# Patient Record
Sex: FEMALE | Race: WHITE | NOT HISPANIC OR LATINO | Employment: FULL TIME | URBAN - NONMETROPOLITAN AREA
[De-identification: names, ages, dates, MRNs, and addresses within clinical notes are randomized per-mention and may not be internally consistent; named-entity substitution may affect disease eponyms.]

---

## 2017-05-09 ENCOUNTER — HOSPITAL ENCOUNTER (EMERGENCY)
Facility: HOSPITAL | Age: 42
Discharge: HOME | End: 2017-05-10
Attending: EMERGENCY MEDICINE | Admitting: EMERGENCY MEDICINE

## 2017-05-09 DIAGNOSIS — R55 NEAR SYNCOPE: ICD-10-CM

## 2017-05-09 DIAGNOSIS — N30.90 CYSTITIS: Primary | ICD-10-CM

## 2017-05-09 PROCEDURE — 96360 HYDRATION IV INFUSION INIT: CPT

## 2017-05-09 PROCEDURE — 99283 EMERGENCY DEPT VISIT LOW MDM: CPT

## 2017-05-09 RX ORDER — MELOXICAM 15 MG/1
15 TABLET ORAL DAILY
COMMUNITY

## 2017-05-09 RX ORDER — ERGOCALCIFEROL 1.25 MG/1
50000 CAPSULE ORAL WEEKLY
COMMUNITY
End: 2018-05-04

## 2017-05-09 RX ORDER — HYDROXYZINE PAMOATE 100 MG/1
100 CAPSULE ORAL 4 TIMES DAILY PRN
COMMUNITY
End: 2018-05-04 | Stop reason: ALTCHOICE

## 2017-05-09 RX ORDER — SODIUM CHLORIDE 0.9 % (FLUSH) 0.9 %
10 SYRINGE (ML) INJECTION AS NEEDED
Status: DISCONTINUED | OUTPATIENT
Start: 2017-05-09 | End: 2017-05-10 | Stop reason: HOSPADM

## 2017-05-09 RX ORDER — DULOXETIN HYDROCHLORIDE 30 MG/1
30 CAPSULE, DELAYED RELEASE ORAL DAILY
COMMUNITY
End: 2018-05-04 | Stop reason: ALTCHOICE

## 2017-05-09 RX ORDER — GABAPENTIN 100 MG/1
100 CAPSULE ORAL 3 TIMES DAILY
COMMUNITY

## 2017-05-09 RX ORDER — TRAZODONE HYDROCHLORIDE 100 MG/1
100 TABLET ORAL NIGHTLY
COMMUNITY
End: 2018-05-04

## 2017-05-09 RX ORDER — TIZANIDINE 4 MG/1
4 TABLET ORAL 3 TIMES DAILY
COMMUNITY

## 2017-05-10 VITALS
RESPIRATION RATE: 18 BRPM | HEIGHT: 69 IN | WEIGHT: 184.38 LBS | DIASTOLIC BLOOD PRESSURE: 85 MMHG | BODY MASS INDEX: 27.31 KG/M2 | OXYGEN SATURATION: 99 % | HEART RATE: 86 BPM | TEMPERATURE: 98.4 F | SYSTOLIC BLOOD PRESSURE: 138 MMHG

## 2017-05-10 LAB
ALBUMIN SERPL-MCNC: 3.8 G/DL (ref 3.5–5)
ALBUMIN/GLOB SERPL: 1.2 G/DL (ref 1.1–2.5)
ALP SERPL-CCNC: 71 U/L (ref 24–120)
ALT SERPL W P-5'-P-CCNC: 25 U/L (ref 0–54)
AMYLASE SERPL-CCNC: 53 U/L (ref 30–110)
ANION GAP SERPL CALCULATED.3IONS-SCNC: 12 MMOL/L (ref 4–13)
AST SERPL-CCNC: 24 U/L (ref 7–45)
BACTERIA UR QL AUTO: ABNORMAL /HPF
BASOPHILS # BLD AUTO: 0.02 10*3/MM3 (ref 0–0.2)
BASOPHILS NFR BLD AUTO: 0.2 % (ref 0–2)
BILIRUB SERPL-MCNC: 0.2 MG/DL (ref 0.1–1)
BILIRUB UR QL STRIP: NEGATIVE
BUN BLD-MCNC: 8 MG/DL (ref 5–21)
BUN/CREAT SERPL: 9.2 (ref 7–25)
CALCIUM SPEC-SCNC: 9.1 MG/DL (ref 8.4–10.4)
CHLORIDE SERPL-SCNC: 102 MMOL/L (ref 98–110)
CLARITY UR: CLEAR
CO2 SERPL-SCNC: 24 MMOL/L (ref 24–31)
COLOR UR: YELLOW
CREAT BLD-MCNC: 0.87 MG/DL (ref 0.5–1.4)
DEPRECATED RDW RBC AUTO: 46.2 FL (ref 40–54)
EOSINOPHIL # BLD AUTO: 0.24 10*3/MM3 (ref 0–0.7)
EOSINOPHIL NFR BLD AUTO: 3 % (ref 0–4)
ERYTHROCYTE [DISTWIDTH] IN BLOOD BY AUTOMATED COUNT: 16.8 % (ref 12–15)
GFR SERPL CREATININE-BSD FRML MDRD: 72 ML/MIN/1.73
GLOBULIN UR ELPH-MCNC: 3.2 GM/DL
GLUCOSE BLD-MCNC: 102 MG/DL (ref 70–100)
GLUCOSE UR STRIP-MCNC: NEGATIVE MG/DL
HCG SERPL QL: NEGATIVE
HCT VFR BLD AUTO: 34.9 % (ref 37–47)
HGB BLD-MCNC: 11.3 G/DL (ref 12–16)
HGB UR QL STRIP.AUTO: NEGATIVE
HYALINE CASTS UR QL AUTO: ABNORMAL /LPF
IMM GRANULOCYTES # BLD: 0.03 10*3/MM3 (ref 0–0.03)
IMM GRANULOCYTES NFR BLD: 0.4 % (ref 0–5)
KETONES UR QL STRIP: ABNORMAL
LEUKOCYTE ESTERASE UR QL STRIP.AUTO: ABNORMAL
LIPASE SERPL-CCNC: 72 U/L (ref 23–203)
LYMPHOCYTES # BLD AUTO: 3.21 10*3/MM3 (ref 0.72–4.86)
LYMPHOCYTES NFR BLD AUTO: 39.5 % (ref 15–45)
MCH RBC QN AUTO: 24.6 PG (ref 28–32)
MCHC RBC AUTO-ENTMCNC: 32.4 G/DL (ref 33–36)
MCV RBC AUTO: 75.9 FL (ref 82–98)
MONOCYTES # BLD AUTO: 0.73 10*3/MM3 (ref 0.19–1.3)
MONOCYTES NFR BLD AUTO: 9 % (ref 4–12)
NEUTROPHILS # BLD AUTO: 3.89 10*3/MM3 (ref 1.87–8.4)
NEUTROPHILS NFR BLD AUTO: 47.9 % (ref 39–78)
NITRITE UR QL STRIP: NEGATIVE
PH UR STRIP.AUTO: 6 [PH] (ref 5–8)
PLATELET # BLD AUTO: 325 10*3/MM3 (ref 130–400)
PMV BLD AUTO: 10.7 FL (ref 6–12)
POTASSIUM BLD-SCNC: 3.7 MMOL/L (ref 3.5–5.3)
PROT SERPL-MCNC: 7 G/DL (ref 6.3–8.7)
PROT UR QL STRIP: NEGATIVE
RBC # BLD AUTO: 4.6 10*6/MM3 (ref 4.2–5.4)
RBC # UR: ABNORMAL /HPF
REF LAB TEST METHOD: ABNORMAL
SODIUM BLD-SCNC: 138 MMOL/L (ref 135–145)
SP GR UR STRIP: 1.03 (ref 1–1.03)
SQUAMOUS #/AREA URNS HPF: ABNORMAL /HPF
UROBILINOGEN UR QL STRIP: ABNORMAL
WBC NRBC COR # BLD: 8.12 10*3/MM3 (ref 4.8–10.8)
WBC UR QL AUTO: ABNORMAL /HPF

## 2017-05-10 PROCEDURE — 87086 URINE CULTURE/COLONY COUNT: CPT | Performed by: EMERGENCY MEDICINE

## 2017-05-10 PROCEDURE — 82150 ASSAY OF AMYLASE: CPT | Performed by: EMERGENCY MEDICINE

## 2017-05-10 PROCEDURE — 85025 COMPLETE CBC W/AUTO DIFF WBC: CPT | Performed by: EMERGENCY MEDICINE

## 2017-05-10 PROCEDURE — 81001 URINALYSIS AUTO W/SCOPE: CPT | Performed by: EMERGENCY MEDICINE

## 2017-05-10 PROCEDURE — 80053 COMPREHEN METABOLIC PANEL: CPT | Performed by: EMERGENCY MEDICINE

## 2017-05-10 PROCEDURE — 84703 CHORIONIC GONADOTROPIN ASSAY: CPT | Performed by: EMERGENCY MEDICINE

## 2017-05-10 PROCEDURE — 83690 ASSAY OF LIPASE: CPT | Performed by: EMERGENCY MEDICINE

## 2017-05-10 PROCEDURE — 96360 HYDRATION IV INFUSION INIT: CPT

## 2017-05-10 RX ORDER — SULFAMETHOXAZOLE AND TRIMETHOPRIM 800; 160 MG/1; MG/1
1 TABLET ORAL 2 TIMES DAILY
Qty: 20 TABLET | Refills: 0 | OUTPATIENT
Start: 2017-05-10 | End: 2018-02-08

## 2017-05-10 RX ORDER — ACETAMINOPHEN 500 MG
1000 TABLET ORAL ONCE
Status: COMPLETED | OUTPATIENT
Start: 2017-05-10 | End: 2017-05-10

## 2017-05-10 RX ADMIN — ACETAMINOPHEN 1000 MG: 500 TABLET ORAL at 00:26

## 2017-05-10 RX ADMIN — SODIUM CHLORIDE 500 ML: 9 INJECTION, SOLUTION INTRAVENOUS at 00:11

## 2017-05-12 LAB — BACTERIA SPEC AEROBE CULT: ABNORMAL

## 2017-09-01 ENCOUNTER — APPOINTMENT (OUTPATIENT)
Dept: GENERAL RADIOLOGY | Facility: HOSPITAL | Age: 42
End: 2017-09-01

## 2017-09-01 ENCOUNTER — HOSPITAL ENCOUNTER (EMERGENCY)
Facility: HOSPITAL | Age: 42
Discharge: HOME | End: 2017-09-01
Admitting: EMERGENCY MEDICINE

## 2017-09-01 VITALS
RESPIRATION RATE: 18 BRPM | DIASTOLIC BLOOD PRESSURE: 80 MMHG | TEMPERATURE: 97.9 F | HEIGHT: 69 IN | WEIGHT: 186 LBS | HEART RATE: 89 BPM | SYSTOLIC BLOOD PRESSURE: 137 MMHG | BODY MASS INDEX: 27.55 KG/M2 | OXYGEN SATURATION: 98 %

## 2017-09-01 DIAGNOSIS — J06.9 UPPER RESPIRATORY TRACT INFECTION, UNSPECIFIED TYPE: Primary | ICD-10-CM

## 2017-09-01 LAB
B-HCG UR QL: NEGATIVE
INTERNAL NEGATIVE CONTROL: NEGATIVE
INTERNAL POSITIVE CONTROL: POSITIVE
Lab: NORMAL

## 2017-09-01 PROCEDURE — 94799 UNLISTED PULMONARY SVC/PX: CPT

## 2017-09-01 PROCEDURE — 71020 HC CHEST PA AND LATERAL: CPT

## 2017-09-01 PROCEDURE — 96372 THER/PROPH/DIAG INJ SC/IM: CPT

## 2017-09-01 PROCEDURE — 25010000002 DEXAMETHASONE PER 1 MG: Performed by: NURSE PRACTITIONER

## 2017-09-01 PROCEDURE — 99283 EMERGENCY DEPT VISIT LOW MDM: CPT

## 2017-09-01 PROCEDURE — 94640 AIRWAY INHALATION TREATMENT: CPT

## 2017-09-01 RX ORDER — IPRATROPIUM BROMIDE AND ALBUTEROL SULFATE 2.5; .5 MG/3ML; MG/3ML
3 SOLUTION RESPIRATORY (INHALATION) ONCE
Status: COMPLETED | OUTPATIENT
Start: 2017-09-01 | End: 2017-09-01

## 2017-09-01 RX ORDER — DEXAMETHASONE SODIUM PHOSPHATE 10 MG/ML
10 INJECTION INTRAMUSCULAR; INTRAVENOUS ONCE
Status: COMPLETED | OUTPATIENT
Start: 2017-09-01 | End: 2017-09-01

## 2017-09-01 RX ORDER — AMOXICILLIN AND CLAVULANATE POTASSIUM 875; 125 MG/1; MG/1
1 TABLET, FILM COATED ORAL 2 TIMES DAILY
Qty: 14 TABLET | Refills: 0 | Status: SHIPPED | OUTPATIENT
Start: 2017-09-01 | End: 2017-09-08

## 2017-09-01 RX ORDER — ALBUTEROL SULFATE 90 UG/1
2 AEROSOL, METERED RESPIRATORY (INHALATION) EVERY 4 HOURS PRN
Qty: 1 INHALER | Refills: 0 | Status: SHIPPED | OUTPATIENT
Start: 2017-09-01 | End: 2017-09-08

## 2017-09-01 RX ORDER — METHYLPREDNISOLONE 4 MG/1
TABLET ORAL
Qty: 1 EACH | Refills: 0 | OUTPATIENT
Start: 2017-09-01 | End: 2018-02-08

## 2017-09-01 RX ADMIN — DEXAMETHASONE SODIUM PHOSPHATE 10 MG: 10 INJECTION, SOLUTION INTRAMUSCULAR; INTRAVENOUS at 13:11

## 2017-09-01 RX ADMIN — IPRATROPIUM BROMIDE AND ALBUTEROL SULFATE 3 ML: 2.5; .5 SOLUTION RESPIRATORY (INHALATION) at 13:19

## 2017-09-01 NOTE — ED PROVIDER NOTES
Subjective   Patient is a 41 y.o. female presenting with URI.   History provided by:  Patient   used: No    URI   Presenting symptoms: congestion, cough and ear pain    Severity:  Mild  Onset quality:  Sudden  Duration:  2 days  Timing:  Constant  Progression:  Unchanged  Chronicity:  New  Relieved by:  Nothing  Worsened by:  Nothing  Ineffective treatments:  None tried  Associated symptoms: myalgias    Risk factors: not elderly, no chronic cardiac disease, no chronic kidney disease, no chronic respiratory disease, no diabetes mellitus, no immunosuppression, no recent illness, no recent travel and no sick contacts        Review of Systems   HENT: Positive for congestion and ear pain.    Respiratory: Positive for cough.    Musculoskeletal: Positive for myalgias.   All other systems reviewed and are negative.      Past Medical History:   Diagnosis Date   • ADHD (attention deficit hyperactivity disorder)    • Back pain    • Neck pain        No Known Allergies    Past Surgical History:   Procedure Laterality Date   •  SECTION     • DENTAL PROCEDURE     • HAND SURGERY     • TUBAL ABDOMINAL LIGATION         History reviewed. No pertinent family history.    Social History     Social History   • Marital status:      Spouse name: N/A   • Number of children: N/A   • Years of education: N/A     Social History Main Topics   • Smoking status: Current Every Day Smoker     Packs/day: 1.00     Types: Cigarettes   • Smokeless tobacco: None   • Alcohol use No   • Drug use: No   • Sexual activity: Not Asked     Other Topics Concern   • None     Social History Narrative           Objective   Physical Exam   Constitutional: She is oriented to person, place, and time. She appears well-developed and well-nourished.   HENT:   Head: Normocephalic and atraumatic.   Eyes: Conjunctivae are normal. Pupils are equal, round, and reactive to light.   Neck: Normal range of motion.   Cardiovascular: Normal rate,  regular rhythm and normal heart sounds.    Pulmonary/Chest: Effort normal and breath sounds normal.   Abdominal: Soft. Bowel sounds are normal.   Musculoskeletal: Normal range of motion.   Neurological: She is alert and oriented to person, place, and time.   Skin: Skin is warm and dry.   Psychiatric: She has a normal mood and affect.   Nursing note and vitals reviewed.      Procedures         ED Course  ED Course   Comment By Time   Patient just x-ray shows no acute findings.  At this time patient will be discharged home in stable condition.  I will give her prescription for Medrol Dosepak.  Advised her to start this tomorrow as well as an inhaler.She states follow-up with her primary care provider Monday for recheck.  Return to the ER if any new or worsening symptoms. Nichole Mark, APRN 09/01 1352      XR Chest 2 View   Final Result   Impression: No evidence of acute cardiopulmonary disease.                                                    This report was finalized on 09/01/2017 13:26 by Dr. Camilo Goodwin MD.        Lab Results (last 24 hours)     Procedure Component Value Units Date/Time    POCT Pregnancy, urine [[id number]]  (Normal) Collected:  09/01/17 1310    Specimen:  Urine Updated:  09/01/17 1310     HCG, Urine, QL Negative     Lot Number \[id number]\     Internal Positive Control Positive     Internal Negative Control Negative                  MDM  Number of Diagnoses or Management Options  Upper respiratory tract infection, unspecified type: new and requires workup     Amount and/or Complexity of Data Reviewed  Clinical lab tests: ordered and reviewed  Tests in the radiology section of CPT®: ordered and reviewed    Patient Progress  Patient progress: stable      Final diagnoses:   Upper respiratory tract infection, unspecified type            Nichole Mark, CAMELIA  09/01/17 1285       Nichole Mark, APRELOISE  09/11/17 6083

## 2017-09-01 NOTE — ED NOTES
C/o coughing non productive with right ear ache no drainage, and weakness onset yesterday. No fever c/o nausea.     Merlene Moran RN  09/01/17 1158

## 2017-10-15 ENCOUNTER — APPOINTMENT (OUTPATIENT)
Dept: GENERAL RADIOLOGY | Facility: HOSPITAL | Age: 42
End: 2017-10-15

## 2017-10-15 ENCOUNTER — HOSPITAL ENCOUNTER (EMERGENCY)
Facility: HOSPITAL | Age: 42
Discharge: HOME | End: 2017-10-15
Attending: EMERGENCY MEDICINE | Admitting: EMERGENCY MEDICINE

## 2017-10-15 VITALS
HEART RATE: 90 BPM | TEMPERATURE: 98.2 F | HEIGHT: 69 IN | BODY MASS INDEX: 26.66 KG/M2 | SYSTOLIC BLOOD PRESSURE: 127 MMHG | DIASTOLIC BLOOD PRESSURE: 70 MMHG | RESPIRATION RATE: 17 BRPM | OXYGEN SATURATION: 99 % | WEIGHT: 180 LBS

## 2017-10-15 DIAGNOSIS — R07.89 ACUTE CHEST WALL PAIN: Primary | ICD-10-CM

## 2017-10-15 DIAGNOSIS — M79.10 MUSCLE PAIN: ICD-10-CM

## 2017-10-15 LAB
ALBUMIN SERPL-MCNC: 4.4 G/DL (ref 3.5–5)
ALBUMIN/GLOB SERPL: 1.3 G/DL (ref 1.1–2.5)
ALP SERPL-CCNC: 70 U/L (ref 24–120)
ALT SERPL W P-5'-P-CCNC: 27 U/L (ref 0–54)
ANION GAP SERPL CALCULATED.3IONS-SCNC: 13 MMOL/L (ref 4–13)
AST SERPL-CCNC: 21 U/L (ref 7–45)
BASOPHILS # BLD AUTO: 0.04 10*3/MM3 (ref 0–0.2)
BASOPHILS NFR BLD AUTO: 0.4 % (ref 0–2)
BILIRUB SERPL-MCNC: 0.2 MG/DL (ref 0.1–1)
BUN BLD-MCNC: 9 MG/DL (ref 5–21)
BUN/CREAT SERPL: 12.7 (ref 7–25)
CALCIUM SPEC-SCNC: 9.3 MG/DL (ref 8.4–10.4)
CHLORIDE SERPL-SCNC: 105 MMOL/L (ref 98–110)
CO2 SERPL-SCNC: 24 MMOL/L (ref 24–31)
CREAT BLD-MCNC: 0.71 MG/DL (ref 0.5–1.4)
D DIMER PPP FEU-MCNC: 0.26 MG/L (FEU) (ref 0–0.5)
DEPRECATED RDW RBC AUTO: 45.5 FL (ref 40–54)
EOSINOPHIL # BLD AUTO: 0.15 10*3/MM3 (ref 0–0.7)
EOSINOPHIL NFR BLD AUTO: 1.6 % (ref 0–4)
ERYTHROCYTE [DISTWIDTH] IN BLOOD BY AUTOMATED COUNT: 15.7 % (ref 12–15)
FLUAV AG NPH QL: NEGATIVE
FLUBV AG NPH QL IA: NEGATIVE
GFR SERPL CREATININE-BSD FRML MDRD: 91 ML/MIN/1.73
GLOBULIN UR ELPH-MCNC: 3.4 GM/DL
GLUCOSE BLD-MCNC: 91 MG/DL (ref 70–100)
HCT VFR BLD AUTO: 42.5 % (ref 37–47)
HGB BLD-MCNC: 13.8 G/DL (ref 12–16)
IMM GRANULOCYTES # BLD: 0.04 10*3/MM3 (ref 0–0.03)
IMM GRANULOCYTES NFR BLD: 0.4 % (ref 0–5)
LYMPHOCYTES # BLD AUTO: 2.52 10*3/MM3 (ref 0.72–4.86)
LYMPHOCYTES NFR BLD AUTO: 26.9 % (ref 15–45)
MCH RBC QN AUTO: 25.8 PG (ref 28–32)
MCHC RBC AUTO-ENTMCNC: 32.5 G/DL (ref 33–36)
MCV RBC AUTO: 79.6 FL (ref 82–98)
MONOCYTES # BLD AUTO: 0.55 10*3/MM3 (ref 0.19–1.3)
MONOCYTES NFR BLD AUTO: 5.9 % (ref 4–12)
NEUTROPHILS # BLD AUTO: 6.07 10*3/MM3 (ref 1.87–8.4)
NEUTROPHILS NFR BLD AUTO: 64.8 % (ref 39–78)
NT-PROBNP SERPL-MCNC: 22.5 PG/ML (ref 0–450)
PLATELET # BLD AUTO: 380 10*3/MM3 (ref 130–400)
PMV BLD AUTO: 10.9 FL (ref 6–12)
POTASSIUM BLD-SCNC: 4.1 MMOL/L (ref 3.5–5.3)
PROT SERPL-MCNC: 7.8 G/DL (ref 6.3–8.7)
RBC # BLD AUTO: 5.34 10*6/MM3 (ref 4.2–5.4)
SODIUM BLD-SCNC: 142 MMOL/L (ref 135–145)
TROPONIN I SERPL-MCNC: <0.012 NG/ML (ref 0–0.03)
TROPONIN I SERPL-MCNC: <0.012 NG/ML (ref 0–0.03)
WBC NRBC COR # BLD: 9.37 10*3/MM3 (ref 4.8–10.8)

## 2017-10-15 PROCEDURE — 36415 COLL VENOUS BLD VENIPUNCTURE: CPT

## 2017-10-15 PROCEDURE — 84484 ASSAY OF TROPONIN QUANT: CPT | Performed by: EMERGENCY MEDICINE

## 2017-10-15 PROCEDURE — 85025 COMPLETE CBC W/AUTO DIFF WBC: CPT | Performed by: EMERGENCY MEDICINE

## 2017-10-15 PROCEDURE — 71020 HC CHEST PA AND LATERAL: CPT

## 2017-10-15 PROCEDURE — 80053 COMPREHEN METABOLIC PANEL: CPT | Performed by: EMERGENCY MEDICINE

## 2017-10-15 PROCEDURE — 85379 FIBRIN DEGRADATION QUANT: CPT | Performed by: EMERGENCY MEDICINE

## 2017-10-15 PROCEDURE — 83880 ASSAY OF NATRIURETIC PEPTIDE: CPT | Performed by: EMERGENCY MEDICINE

## 2017-10-15 PROCEDURE — 93005 ELECTROCARDIOGRAM TRACING: CPT | Performed by: EMERGENCY MEDICINE

## 2017-10-15 PROCEDURE — 96375 TX/PRO/DX INJ NEW DRUG ADDON: CPT

## 2017-10-15 PROCEDURE — 25010000002 ONDANSETRON PER 1 MG: Performed by: EMERGENCY MEDICINE

## 2017-10-15 PROCEDURE — 93005 ELECTROCARDIOGRAM TRACING: CPT

## 2017-10-15 PROCEDURE — 87804 INFLUENZA ASSAY W/OPTIC: CPT | Performed by: EMERGENCY MEDICINE

## 2017-10-15 PROCEDURE — 93010 ELECTROCARDIOGRAM REPORT: CPT | Performed by: INTERNAL MEDICINE

## 2017-10-15 PROCEDURE — 99284 EMERGENCY DEPT VISIT MOD MDM: CPT

## 2017-10-15 PROCEDURE — 96374 THER/PROPH/DIAG INJ IV PUSH: CPT

## 2017-10-15 PROCEDURE — 25010000002 HYDROMORPHONE PER 4 MG: Performed by: EMERGENCY MEDICINE

## 2017-10-15 RX ORDER — ONDANSETRON 2 MG/ML
4 INJECTION INTRAMUSCULAR; INTRAVENOUS ONCE
Status: COMPLETED | OUTPATIENT
Start: 2017-10-15 | End: 2017-10-15

## 2017-10-15 RX ORDER — HYDROCODONE BITARTRATE AND ACETAMINOPHEN 7.5; 325 MG/1; MG/1
1 TABLET ORAL ONCE
Status: COMPLETED | OUTPATIENT
Start: 2017-10-15 | End: 2017-10-15

## 2017-10-15 RX ADMIN — HYDROMORPHONE HYDROCHLORIDE 1 MG: 1 INJECTION, SOLUTION INTRAMUSCULAR; INTRAVENOUS; SUBCUTANEOUS at 12:54

## 2017-10-15 RX ADMIN — ONDANSETRON 4 MG: 2 INJECTION, SOLUTION INTRAMUSCULAR; INTRAVENOUS at 12:54

## 2017-10-15 RX ADMIN — HYDROCODONE BITARTRATE AND ACETAMINOPHEN 1 TABLET: 7.5; 325 TABLET ORAL at 17:00

## 2017-10-15 NOTE — ED NOTES
"PATIENT C/O NONPRODUCTIVE COUGH X 2 WEEKS. PT STATES SHE HAD \"A COUGHING FIT\" THIS MORNING AND SINCE THAT TIME HAS HAD LEFT LOWER RIB PAIN AND \"MUSCLE SPASMS\" IN LEFT MID BACK. PT STATES PAIN WORSE WITH MOVEMENT AND LYING FLAT.      Fawn Cummings RN  10/15/17 1108    "

## 2017-10-15 NOTE — ED PROVIDER NOTES
"Subjective   HPI Comments: Patient is a 41-year-old female who reports that she has had a dry cough for the last 2 weeks.  She reports this morning at approximately 9:30 AM that she was drinking coffee and suddenly coughed and developed \"sharp\" left-sided lower front rib pain and left-sided upper back pain.  She reports that when the pain is at its worse it gives her mild shortness of breath.  She reports her pain is worsened by movement and lying down and relieved by nothing.      History provided by:  Patient      Review of Systems   Constitutional: Negative.    HENT: Negative.    Eyes: Negative.    Respiratory: Positive for cough and shortness of breath.    Cardiovascular: Negative.    Gastrointestinal: Negative.    Endocrine: Negative.    Genitourinary: Negative.    Musculoskeletal: Positive for back pain (Left sided upper posterior pain).        Left lower anterior rib pain.   Skin: Negative.    Allergic/Immunologic: Negative.    Neurological: Negative.    Hematological: Negative.    Psychiatric/Behavioral: Negative.    All other systems reviewed and are negative.      Past Medical History:   Diagnosis Date   • ADHD (attention deficit hyperactivity disorder)    • Back pain    • Neck pain        No Known Allergies    Past Surgical History:   Procedure Laterality Date   •  SECTION     • DENTAL PROCEDURE     • HAND SURGERY     • TUBAL ABDOMINAL LIGATION         History reviewed. No pertinent family history.    Social History     Social History   • Marital status:      Spouse name: N/A   • Number of children: N/A   • Years of education: N/A     Social History Main Topics   • Smoking status: Current Every Day Smoker     Packs/day: 1.00     Types: Cigarettes   • Smokeless tobacco: None   • Alcohol use No   • Drug use: No   • Sexual activity: Not Asked     Other Topics Concern   • None     Social History Narrative           Objective   Physical Exam   Constitutional: She is oriented to person, place, " and time. She appears well-developed and well-nourished.   Moderately painful appearing female.   HENT:   Head: Normocephalic and atraumatic.   Right Ear: External ear normal.   Left Ear: External ear normal.   Nose: Nose normal.   Mouth/Throat: Oropharynx is clear and moist.   Eyes: Conjunctivae and EOM are normal. Pupils are equal, round, and reactive to light. Right eye exhibits no discharge. Left eye exhibits no discharge.   Neck: Normal range of motion. Neck supple. No tracheal deviation present. No thyromegaly present.   Cardiovascular: Normal rate, regular rhythm, normal heart sounds and intact distal pulses.    No murmur heard.  Pulmonary/Chest: Effort normal and breath sounds normal. No respiratory distress. She exhibits tenderness (Mild pain to palpation of the left anterior lower chest wall area.).   Abdominal: Soft. She exhibits no distension. There is no tenderness.   Musculoskeletal: Normal range of motion. She exhibits tenderness (Severe pain to palpation of the left sided posterior rib area of the upper thoracic area (from the T-2 to the T-4 area.). She exhibits no edema or deformity.   Neurological: She is alert and oriented to person, place, and time. No cranial nerve deficit.   Skin: Skin is warm and dry. No erythema. No pallor.   Psychiatric: Judgment normal.   Painful appearing female   Nursing note and vitals reviewed.      Procedures         ED Course  ED Course   Comment By Time   The patient reports that she is feeling better at this time. Caleb Borjas MD 10/15 1444                  MDM  Number of Diagnoses or Management Options  Acute chest wall pain: new and requires workup  Muscle pain: new and requires workup     Amount and/or Complexity of Data Reviewed  Clinical lab tests: ordered and reviewed  Tests in the radiology section of CPT®: ordered and reviewed    Risk of Complications, Morbidity, and/or Mortality  Presenting problems: moderate  Diagnostic procedures:  moderate  Management options: low    Patient Progress  Patient progress: stable      Final diagnoses:   Acute chest wall pain   Muscle pain            Caleb Borjas MD  10/17/17 4095

## 2018-01-09 ENCOUNTER — APPOINTMENT (OUTPATIENT)
Dept: GENERAL RADIOLOGY | Facility: HOSPITAL | Age: 43
End: 2018-01-09

## 2018-01-09 ENCOUNTER — HOSPITAL ENCOUNTER (EMERGENCY)
Facility: HOSPITAL | Age: 43
Discharge: HOME | End: 2018-01-09
Admitting: EMERGENCY MEDICINE

## 2018-01-09 VITALS
SYSTOLIC BLOOD PRESSURE: 133 MMHG | TEMPERATURE: 98.1 F | DIASTOLIC BLOOD PRESSURE: 83 MMHG | WEIGHT: 170 LBS | HEIGHT: 69 IN | BODY MASS INDEX: 25.18 KG/M2 | HEART RATE: 84 BPM | RESPIRATION RATE: 18 BRPM | OXYGEN SATURATION: 100 %

## 2018-01-09 DIAGNOSIS — N39.0 UTI (URINARY TRACT INFECTION), UNCOMPLICATED: Primary | ICD-10-CM

## 2018-01-09 DIAGNOSIS — N93.9 VAGINAL BLEEDING: ICD-10-CM

## 2018-01-09 LAB
ALBUMIN SERPL-MCNC: 4.1 G/DL (ref 3.5–5)
ALBUMIN/GLOB SERPL: 1.2 G/DL (ref 1.1–2.5)
ALP SERPL-CCNC: 88 U/L (ref 24–120)
ALT SERPL W P-5'-P-CCNC: 29 U/L (ref 0–54)
ANION GAP SERPL CALCULATED.3IONS-SCNC: 12 MMOL/L (ref 4–13)
AST SERPL-CCNC: 24 U/L (ref 7–45)
B-HCG UR QL: NEGATIVE
BACTERIA UR QL AUTO: ABNORMAL /HPF
BASOPHILS # BLD AUTO: 0.02 10*3/MM3 (ref 0–0.2)
BASOPHILS NFR BLD AUTO: 0.3 % (ref 0–2)
BILIRUB SERPL-MCNC: 0.2 MG/DL (ref 0.1–1)
BILIRUB UR QL STRIP: ABNORMAL
BUN BLD-MCNC: 7 MG/DL (ref 5–21)
BUN/CREAT SERPL: 9.2 (ref 7–25)
CALCIUM SPEC-SCNC: 8.9 MG/DL (ref 8.4–10.4)
CHLORIDE SERPL-SCNC: 103 MMOL/L (ref 98–110)
CLARITY UR: CLEAR
CO2 SERPL-SCNC: 28 MMOL/L (ref 24–31)
COLOR UR: ABNORMAL
CREAT BLD-MCNC: 0.76 MG/DL (ref 0.5–1.4)
DEPRECATED RDW RBC AUTO: 45.7 FL (ref 40–54)
EOSINOPHIL # BLD AUTO: 0.23 10*3/MM3 (ref 0–0.7)
EOSINOPHIL NFR BLD AUTO: 3 % (ref 0–4)
ERYTHROCYTE [DISTWIDTH] IN BLOOD BY AUTOMATED COUNT: 16.1 % (ref 12–15)
FLUAV AG NPH QL: NEGATIVE
FLUBV AG NPH QL IA: NEGATIVE
GFR SERPL CREATININE-BSD FRML MDRD: 84 ML/MIN/1.73
GLOBULIN UR ELPH-MCNC: 3.3 GM/DL
GLUCOSE BLD-MCNC: 95 MG/DL (ref 70–100)
GLUCOSE UR STRIP-MCNC: NEGATIVE MG/DL
HCT VFR BLD AUTO: 38.1 % (ref 37–47)
HGB BLD-MCNC: 12.1 G/DL (ref 12–16)
HGB UR QL STRIP.AUTO: ABNORMAL
HYALINE CASTS UR QL AUTO: ABNORMAL /LPF
IMM GRANULOCYTES # BLD: 0.04 10*3/MM3 (ref 0–0.03)
IMM GRANULOCYTES NFR BLD: 0.5 % (ref 0–5)
KETONES UR QL STRIP: ABNORMAL
LEUKOCYTE ESTERASE UR QL STRIP.AUTO: ABNORMAL
LYMPHOCYTES # BLD AUTO: 2.02 10*3/MM3 (ref 0.72–4.86)
LYMPHOCYTES NFR BLD AUTO: 26.6 % (ref 15–45)
MCH RBC QN AUTO: 25.2 PG (ref 28–32)
MCHC RBC AUTO-ENTMCNC: 31.8 G/DL (ref 33–36)
MCV RBC AUTO: 79.2 FL (ref 82–98)
MONOCYTES # BLD AUTO: 0.65 10*3/MM3 (ref 0.19–1.3)
MONOCYTES NFR BLD AUTO: 8.6 % (ref 4–12)
MUCOUS THREADS URNS QL MICRO: ABNORMAL /HPF
NEUTROPHILS # BLD AUTO: 4.64 10*3/MM3 (ref 1.87–8.4)
NEUTROPHILS NFR BLD AUTO: 61 % (ref 39–78)
NITRITE UR QL STRIP: NEGATIVE
NRBC BLD MANUAL-RTO: 0 /100 WBC (ref 0–0)
PH UR STRIP.AUTO: 5.5 [PH] (ref 5–8)
PLAT MORPH BLD: NORMAL
PLATELET # BLD AUTO: 361 10*3/MM3 (ref 130–400)
PMV BLD AUTO: 10.5 FL (ref 6–12)
POTASSIUM BLD-SCNC: 3.3 MMOL/L (ref 3.5–5.3)
PROT SERPL-MCNC: 7.4 G/DL (ref 6.3–8.7)
PROT UR QL STRIP: ABNORMAL
RBC # BLD AUTO: 4.81 10*6/MM3 (ref 4.2–5.4)
RBC # UR: ABNORMAL /HPF
RBC MORPH BLD: NORMAL
REF LAB TEST METHOD: ABNORMAL
SODIUM BLD-SCNC: 143 MMOL/L (ref 135–145)
SP GR UR STRIP: >1.03 (ref 1–1.03)
SQUAMOUS #/AREA URNS HPF: ABNORMAL /HPF
T3FREE SERPL-MCNC: 3.53 PG/ML (ref 2.77–5.27)
T4 FREE SERPL-MCNC: 1.22 NG/DL (ref 0.78–2.19)
TSH SERPL DL<=0.05 MIU/L-ACNC: 0.55 MIU/ML (ref 0.47–4.68)
UROBILINOGEN UR QL STRIP: ABNORMAL
WBC MORPH BLD: NORMAL
WBC NRBC COR # BLD: 7.6 10*3/MM3 (ref 4.8–10.8)
WBC UR QL AUTO: ABNORMAL /HPF

## 2018-01-09 PROCEDURE — 81001 URINALYSIS AUTO W/SCOPE: CPT | Performed by: PHYSICIAN ASSISTANT

## 2018-01-09 PROCEDURE — 84481 FREE ASSAY (FT-3): CPT | Performed by: PHYSICIAN ASSISTANT

## 2018-01-09 PROCEDURE — 99283 EMERGENCY DEPT VISIT LOW MDM: CPT

## 2018-01-09 PROCEDURE — 96361 HYDRATE IV INFUSION ADD-ON: CPT

## 2018-01-09 PROCEDURE — 96374 THER/PROPH/DIAG INJ IV PUSH: CPT

## 2018-01-09 PROCEDURE — 71046 X-RAY EXAM CHEST 2 VIEWS: CPT

## 2018-01-09 PROCEDURE — 80050 GENERAL HEALTH PANEL: CPT | Performed by: PHYSICIAN ASSISTANT

## 2018-01-09 PROCEDURE — 85007 BL SMEAR W/DIFF WBC COUNT: CPT | Performed by: PHYSICIAN ASSISTANT

## 2018-01-09 PROCEDURE — 87804 INFLUENZA ASSAY W/OPTIC: CPT | Performed by: PHYSICIAN ASSISTANT

## 2018-01-09 PROCEDURE — 81025 URINE PREGNANCY TEST: CPT | Performed by: PHYSICIAN ASSISTANT

## 2018-01-09 PROCEDURE — 84439 ASSAY OF FREE THYROXINE: CPT | Performed by: PHYSICIAN ASSISTANT

## 2018-01-09 PROCEDURE — 87086 URINE CULTURE/COLONY COUNT: CPT | Performed by: PHYSICIAN ASSISTANT

## 2018-01-09 PROCEDURE — 25010000002 KETOROLAC TROMETHAMINE PER 15 MG: Performed by: PHYSICIAN ASSISTANT

## 2018-01-09 RX ORDER — POTASSIUM CHLORIDE 750 MG/1
20 CAPSULE, EXTENDED RELEASE ORAL ONCE
Status: COMPLETED | OUTPATIENT
Start: 2018-01-09 | End: 2018-01-09

## 2018-01-09 RX ORDER — KETOROLAC TROMETHAMINE 30 MG/ML
30 INJECTION, SOLUTION INTRAMUSCULAR; INTRAVENOUS EVERY 6 HOURS PRN
Status: DISCONTINUED | OUTPATIENT
Start: 2018-01-09 | End: 2018-01-09 | Stop reason: HOSPADM

## 2018-01-09 RX ORDER — MEDROXYPROGESTERONE ACETATE 10 MG/1
10 TABLET ORAL DAILY
Qty: 7 TABLET | Refills: 0 | Status: SHIPPED | OUTPATIENT
Start: 2018-01-09 | End: 2018-05-04 | Stop reason: ALTCHOICE

## 2018-01-09 RX ORDER — CIPROFLOXACIN 500 MG/1
500 TABLET, FILM COATED ORAL EVERY 12 HOURS SCHEDULED
Qty: 14 TABLET | Refills: 0 | Status: SHIPPED | OUTPATIENT
Start: 2018-01-09 | End: 2018-02-13

## 2018-01-09 RX ORDER — SODIUM CHLORIDE 9 MG/ML
125 INJECTION, SOLUTION INTRAVENOUS CONTINUOUS
Status: DISCONTINUED | OUTPATIENT
Start: 2018-01-09 | End: 2018-01-09 | Stop reason: HOSPADM

## 2018-01-09 RX ORDER — POTASSIUM CHLORIDE 20 MEQ/1
20 TABLET, EXTENDED RELEASE ORAL ONCE
Status: DISCONTINUED | OUTPATIENT
Start: 2018-01-09 | End: 2018-01-09 | Stop reason: CLARIF

## 2018-01-09 RX ADMIN — KETOROLAC TROMETHAMINE 30 MG: 30 INJECTION, SOLUTION INTRAMUSCULAR at 19:40

## 2018-01-09 RX ADMIN — SODIUM CHLORIDE 1000 ML: 9 INJECTION, SOLUTION INTRAVENOUS at 19:21

## 2018-01-09 RX ADMIN — POTASSIUM CHLORIDE 20 MEQ: 750 CAPSULE, EXTENDED RELEASE ORAL at 20:35

## 2018-01-10 NOTE — ED NOTES
C/o's headache, lightheaded, vaginal bleeding,usually I go 5 days, but it's been going on for 10 days, it stopped this morning for a little bit, had a doctor's appointmt for my medication, then after the doctor, it started again, I'm anemic, not taking iron.'     Evelin Kiran, FREDA  01/09/18 1802       Evelin Kiran, FREDA  01/09/18 1808

## 2018-01-10 NOTE — ED PROVIDER NOTES
Subjective   Patient is a 42 y.o. female presenting with vaginal bleeding.   Vaginal Bleeding   Associated symptoms: abdominal pain, fatigue and nausea    Associated symptoms: no fever and no vaginal discharge      Patient is a 42-year-old  female chief complaint of feeling fatigued with vaginal bleeding.  She describes her symptoms first began 5 days ago when she is had a dry hacking cough with nausea and vomiting.  She reports she vomited for 3 days.  She has vomited about 2 episodes per day.  She denies any diarrhea associated with it.  She had mild lower abnormal discomfort and low back pain as well which she normally experiences with menstrual cramping.  She denies any fever.  She denies any pain or sore throat.  She began her expected menses 9 days ago.  She describes it normally lasts for 5 days, but hers has extended.  The heaviness of the flow maintained the same.  She normally goes through a super pad every 2-3 hours.  She denies any clots.  She went to see her primary care physician about her illness today.  She was diagnosed with the stomach bug.  She did not inform about abnormal vaginal bleeding since came to ER further evaluation.  She denies being on any hormones presently.  She has a history of tubal ligation. She denies any thyroid issues.     Review of Systems   Constitutional: Positive for appetite change and fatigue. Negative for activity change, chills and fever.   Eyes: Negative.    Respiratory: Positive for cough. Negative for shortness of breath.    Cardiovascular: Negative.    Gastrointestinal: Positive for abdominal pain and nausea. Negative for constipation, diarrhea and vomiting.   Genitourinary: Positive for menstrual problem and vaginal bleeding. Negative for frequency, vaginal discharge and vaginal pain.   Musculoskeletal: Negative.    Neurological: Positive for headaches. Negative for weakness.   Psychiatric/Behavioral: Negative.        Past Medical History:   Diagnosis Date    • ADHD (attention deficit hyperactivity disorder)    • Back pain    • Neck pain        No Known Allergies    Past Surgical History:   Procedure Laterality Date   •  SECTION     • DENTAL PROCEDURE     • HAND SURGERY     • TUBAL ABDOMINAL LIGATION         History reviewed. No pertinent family history.    Social History     Social History   • Marital status:      Spouse name: N/A   • Number of children: N/A   • Years of education: N/A     Social History Main Topics   • Smoking status: Current Every Day Smoker     Packs/day: 1.00     Types: Cigarettes   • Smokeless tobacco: None   • Alcohol use No   • Drug use: No   • Sexual activity: Defer     Other Topics Concern   • None     Social History Narrative   • None       Prior to Admission medications    Medication Sig Start Date End Date Taking? Authorizing Provider   DULoxetine (CYMBALTA) 30 MG capsule Take 30 mg by mouth Daily.    Historical Provider, MD   gabapentin (NEURONTIN) 100 MG capsule Take 100 mg by mouth 3 (Three) Times a Day.    Historical Provider, MD   hydrOXYzine (VISTARIL) 100 MG capsule Take 100 mg by mouth 4 (Four) Times a Day As Needed for Itching.    Historical Provider, MD   meloxicam (MOBIC) 15 MG tablet Take 15 mg by mouth Daily.    Historical Provider, MD   MethylPREDNISolone (MEDROL, FUENTES,) 4 MG tablet Take as directed on package instructions. 17   CAMELIA Daly   sulfamethoxazole-trimethoprim (BACTRIM DS,SEPTRA DS) 800-160 MG per tablet Take 1 tablet by mouth 2 (Two) Times a Day. 5/10/17   Chucho Feldman Jr., MD   tiZANidine (ZANAFLEX) 4 MG tablet Take 4 mg by mouth 3 (Three) Times a Day.    Historical Provider, MD   traZODone (DESYREL) 100 MG tablet Take 100 mg by mouth Every Night.    Historical Provider, MD   vitamin D (ERGOCALCIFEROL) 02368 UNITS capsule capsule Take 50,000 Units by mouth 1 (One) Time Per Week.    Historical Provider, MD       Medications   sodium chloride 0.9 % infusion (not administered)  "  ketorolac (TORADOL) injection 30 mg (30 mg Intravenous Given 1/9/18 1940)   sodium chloride 0.9 % bolus 1,000 mL (0 mL Intravenous Stopped 1/9/18 2035)   potassium chloride (MICRO-K) CR capsule 20 mEq (20 mEq Oral Given 1/9/18 2035)       /83 (BP Location: Left arm, Patient Position: Sitting)  Pulse 84  Temp 98.1 °F (36.7 °C) (Oral)   Resp 18  Ht 175.3 cm (69\")  Wt 77.1 kg (170 lb)  SpO2 100%  BMI 25.1 kg/m2      Objective   Physical Exam   Constitutional: She is oriented to person, place, and time. She appears well-developed and well-nourished.  Non-toxic appearance. No distress.   HENT:   Head: Normocephalic and atraumatic.   Right Ear: External ear normal.   Left Ear: External ear normal.   Nose: Nose normal.   Mouth/Throat: Uvula is midline, oropharynx is clear and moist and mucous membranes are normal.   Eyes: Conjunctivae, EOM and lids are normal. Pupils are equal, round, and reactive to light. Lids are everted and swept, no foreign bodies found.   Neck: Trachea normal, normal range of motion, full passive range of motion without pain and phonation normal. Neck supple. Normal carotid pulses and no JVD present. Carotid bruit is not present. No rigidity.   Cardiovascular: Normal rate, regular rhythm, normal heart sounds, intact distal pulses and normal pulses.    Pulmonary/Chest: Effort normal and breath sounds normal. No stridor. No apnea and no tachypnea. No respiratory distress. She has no wheezes. She has no rales. She exhibits no tenderness.   Abdominal: Soft. Normal appearance, normal aorta and bowel sounds are normal. There is no hepatosplenomegaly. There is no tenderness. There is no guarding. No hernia.   Musculoskeletal: Normal range of motion.       Vascular Status -  Her exam exhibits right foot vasculature normal. Her exam exhibits no right foot edema. Her exam exhibits left foot vasculature normal. Her exam exhibits no left foot edema.  Neurological: She is alert and oriented to " person, place, and time. She has normal strength and normal reflexes. She displays normal reflexes. No cranial nerve deficit or sensory deficit. Coordination and gait normal. GCS eye subscore is 4. GCS verbal subscore is 5. GCS motor subscore is 6.   Skin: Skin is warm, dry and intact. She is not diaphoretic. No pallor.   Psychiatric: She has a normal mood and affect. Her speech is normal and behavior is normal.   Nursing note and vitals reviewed.      Procedures         Lab Results (last 24 hours)     Procedure Component Value Units Date/Time    Urinalysis With / Culture If Indicated - Urine, Clean Catch [[id number]]  (Abnormal) Collected:  01/09/18 1919    Specimen:  Urine from Urine, Clean Catch Updated:  01/09/18 2005     Color, UA Dark Yellow (A)     Appearance, UA Clear     pH, UA 5.5     Specific Gravity, UA >1.030 (H)     Glucose, UA Negative     Ketones, UA Trace (A)     Bilirubin, UA Small (1+) (A)     Blood, UA Large (3+) (A)     Protein, UA Trace (A)     Leuk Esterase, UA Small (1+) (A)     Nitrite, UA Negative     Urobilinogen, UA 1.0 E.U./dL    Pregnancy, Urine - Urine, Clean Catch [[id number]]  (Normal) Collected:  01/09/18 1919    Specimen:  Urine from Urine, Clean Catch Updated:  01/09/18 1958     HCG, Urine QL Negative    Influenza Antigen, Rapid - Swab, Nasopharynx [[id number]]  (Normal) Collected:  01/09/18 1919    Specimen:  Swab from Nasopharynx Updated:  01/09/18 1944     Influenza A Ag, EIA Negative     Influenza B Ag, EIA Negative    Narrative:         Recommend confirmation of negative results by viral culture or molecular assay.    Urine Culture - Urine, Urine, Clean Catch [[id number]] Collected:  01/09/18 1919    Specimen:  Urine from Urine, Clean Catch Updated:  01/09/18 1950    Urinalysis, Microscopic Only - Urine, Clean Catch [[id number]]  (Abnormal) Collected:  01/09/18 1919    Specimen:  Urine from Urine, Clean Catch Updated:  01/09/18 2005     RBC, UA 6-12 (A) /HPF      WBC, UA 6-12 (A)  /HPF      Bacteria, UA Trace (A) /HPF      Squamous Epithelial Cells, UA 0-2 /HPF      Hyaline Casts, UA 3-6 /LPF      Mucus, UA Moderate/2+ (A) /HPF      Methodology Automated Microscopy    CBC & Differential [[id number]] Collected:  01/09/18 1920    Specimen:  Blood Updated:  01/09/18 1949    Narrative:       The following orders were created for panel order CBC & Differential.  Procedure                               Abnormality         Status                     ---------                               -----------         ------                     Scan Slide[[id number]]                   Normal              Final result               CBC Auto Differential[[id number]]        Abnormal            Final result                 Please view results for these tests on the individual orders.    Comprehensive Metabolic Panel [[id number]]  (Abnormal) Collected:  01/09/18 1920    Specimen:  Blood Updated:  01/09/18 1942     Glucose 95 mg/dL      BUN 7 mg/dL      Creatinine 0.76 mg/dL      Sodium 143 mmol/L      Potassium 3.3 (L) mmol/L      Chloride 103 mmol/L      CO2 28.0 mmol/L      Calcium 8.9 mg/dL      Total Protein 7.4 g/dL      Albumin 4.10 g/dL      ALT (SGPT) 29 U/L      AST (SGOT) 24 U/L      Alkaline Phosphatase 88 U/L      Total Bilirubin 0.2 mg/dL      eGFR Non African Amer 84 mL/min/1.73      Globulin 3.3 gm/dL      A/G Ratio 1.2 g/dL      BUN/Creatinine Ratio 9.2     Anion Gap 12.0 mmol/L     TSH [[id number]]  (Normal) Collected:  01/09/18 1920    Specimen:  Blood Updated:  01/09/18 2013     TSH 0.546 mIU/mL     T3, Free [[id number]]  (Normal) Collected:  01/09/18 1920    Specimen:  Blood Updated:  01/09/18 2001     T3, Free 3.53 pg/mL     T4, Free [[id number]]  (Normal) Collected:  01/09/18 1920    Specimen:  Blood Updated:  01/09/18 2001     Free T4 1.22 ng/dL     CBC Auto Differential [[id number]]  (Abnormal) Collected:  01/09/18 1920    Specimen:  Blood Updated:  01/09/18 1949     WBC 7.60 10*3/mm3      RBC 4.81  10*6/mm3      Hemoglobin 12.1 g/dL      Hematocrit 38.1 %      MCV 79.2 (L) fL      MCH 25.2 (L) pg      MCHC 31.8 (L) g/dL      RDW 16.1 (H) %      RDW-SD 45.7 fl      MPV 10.5 fL      Platelets 361 10*3/mm3      Neutrophil % 61.0 %      Lymphocyte % 26.6 %      Monocyte % 8.6 %      Eosinophil % 3.0 %      Basophil % 0.3 %      Immature Grans % 0.5 %      Neutrophils, Absolute 4.64 10*3/mm3      Lymphocytes, Absolute 2.02 10*3/mm3      Monocytes, Absolute 0.65 10*3/mm3      Eosinophils, Absolute 0.23 10*3/mm3      Basophils, Absolute 0.02 10*3/mm3      Immature Grans, Absolute 0.04 (H) 10*3/mm3      nRBC 0.0 /100 WBC     Scan Slide [[id number]]  (Normal) Collected:  01/09/18 1920    Specimen:  Blood Updated:  01/09/18 1949     RBC Morphology Normal     WBC Morphology Normal     Platelet Morphology Normal          Xr Chest 2 View    Result Date: 1/9/2018  Narrative: XR CHEST 2 VW- 1/9/2018 6:54 PM CST  HISTORY: cough  COMPARISON: 10/15/2017  FINDINGS: Upright frontal and lateral radiographs of the chest were obtained.  The lungs are clear. The cardiomediastinal silhouette and pulmonary vascularity are unchanged. The osseous structures and surrounding soft tissues demonstrate no acute abnormality.      Impression: 1. No radiographic evidence of acute cardiopulmonary process.   This report was finalized on 01/09/2018 19:09 by Dr. Haris Mendoza MD.      ED Course  ED Course        Patient has been updated on test results. She reports understanding of plan of care.   [facility]    Final diagnoses:   UTI (urinary tract infection), uncomplicated   Vaginal bleeding          Lou-ABIGAIL Felipe  01/09/18 5189

## 2018-01-11 LAB
BACTERIA SPEC AEROBE CULT: ABNORMAL
BACTERIA SPEC AEROBE CULT: ABNORMAL

## 2018-02-07 ENCOUNTER — TRANSCRIBE ORDERS (OUTPATIENT)
Dept: ADMINISTRATIVE | Facility: HOSPITAL | Age: 43
End: 2018-02-07

## 2018-02-07 ENCOUNTER — LAB (OUTPATIENT)
Dept: LAB | Facility: HOSPITAL | Age: 43
End: 2018-02-07

## 2018-02-07 ENCOUNTER — HOSPITAL ENCOUNTER (OUTPATIENT)
Dept: ULTRASOUND IMAGING | Facility: HOSPITAL | Age: 43
Discharge: HOME | End: 2018-02-07
Admitting: NURSE PRACTITIONER

## 2018-02-07 DIAGNOSIS — N92.0 EXCESSIVE AND FREQUENT MENSTRUATION WITH REGULAR CYCLE: Primary | ICD-10-CM

## 2018-02-07 DIAGNOSIS — N93.9 ABNORMAL UTERINE AND VAGINAL BLEEDING, UNSPECIFIED (CODE): Primary | ICD-10-CM

## 2018-02-07 DIAGNOSIS — N92.0 EXCESSIVE AND FREQUENT MENSTRUATION WITH REGULAR CYCLE: ICD-10-CM

## 2018-02-07 DIAGNOSIS — N93.9 ABNORMAL UTERINE AND VAGINAL BLEEDING, UNSPECIFIED (CODE): ICD-10-CM

## 2018-02-07 LAB
ALBUMIN SERPL-MCNC: 4.2 G/DL (ref 3.5–5)
ALBUMIN/GLOB SERPL: 1.1 G/DL (ref 1.1–2.5)
ALP SERPL-CCNC: 81 U/L (ref 24–120)
ALT SERPL W P-5'-P-CCNC: 32 U/L (ref 0–54)
ANION GAP SERPL CALCULATED.3IONS-SCNC: 11 MMOL/L (ref 4–13)
AST SERPL-CCNC: 13 U/L (ref 7–45)
AUTO MIXED CELLS #: 0.4 10*3/MM3 (ref 0.1–2.6)
AUTO MIXED CELLS %: 5.6 % (ref 0.1–24)
BILIRUB SERPL-MCNC: 0.2 MG/DL (ref 0.1–1)
BUN BLD-MCNC: 5 MG/DL (ref 5–21)
BUN/CREAT SERPL: 6.8
CALCIUM SPEC-SCNC: 9.1 MG/DL (ref 8.4–10.4)
CHLORIDE SERPL-SCNC: 102 MMOL/L (ref 98–110)
CO2 SERPL-SCNC: 27 MMOL/L (ref 24–31)
CREAT BLD-MCNC: 0.73 MG/DL (ref 0.5–1.4)
ERYTHROCYTE [DISTWIDTH] IN BLOOD BY AUTOMATED COUNT: 14.9 % (ref 12–15)
GFR SERPL CREATININE-BSD FRML MDRD: 88 ML/MIN/1.73
GLOBULIN UR ELPH-MCNC: 3.7 GM/DL
GLUCOSE BLD-MCNC: 93 MG/DL (ref 70–100)
HCT VFR BLD AUTO: 38.4 % (ref 37–47)
HGB BLD-MCNC: 12.3 G/DL (ref 12–16)
LYMPHOCYTES # BLD AUTO: 2.5 10*3/MM3 (ref 0.8–7)
LYMPHOCYTES NFR BLD AUTO: 31.8 % (ref 15–45)
MCH RBC QN AUTO: 25.2 PG (ref 28–32)
MCHC RBC AUTO-ENTMCNC: 32 G/DL (ref 33–36)
MCV RBC AUTO: 78.7 FL (ref 82–98)
NEUTROPHILS # BLD AUTO: 5.1 10*3/MM3 (ref 1.5–8.3)
NEUTROPHILS NFR BLD AUTO: 62.6 % (ref 39–78)
PLATELET # BLD AUTO: 327 10*3/MM3 (ref 130–400)
PMV BLD AUTO: 9.5 FL (ref 6–12)
POTASSIUM BLD-SCNC: 4 MMOL/L (ref 3.5–5.3)
PROT SERPL-MCNC: 7.9 G/DL (ref 6.3–8.7)
RBC # BLD AUTO: 4.88 10*6/MM3 (ref 4.2–5.4)
SODIUM BLD-SCNC: 140 MMOL/L (ref 135–145)
WBC NRBC COR # BLD: 8 10*3/MM3 (ref 4.8–10.8)

## 2018-02-07 PROCEDURE — 36415 COLL VENOUS BLD VENIPUNCTURE: CPT

## 2018-02-07 PROCEDURE — 80053 COMPREHEN METABOLIC PANEL: CPT

## 2018-02-07 PROCEDURE — 76830 TRANSVAGINAL US NON-OB: CPT

## 2018-02-07 PROCEDURE — 85025 COMPLETE CBC W/AUTO DIFF WBC: CPT

## 2018-02-13 ENCOUNTER — OFFICE VISIT (OUTPATIENT)
Dept: RETAIL CLINIC | Facility: CLINIC | Age: 43
End: 2018-02-13

## 2018-02-13 VITALS
HEART RATE: 83 BPM | RESPIRATION RATE: 20 BRPM | DIASTOLIC BLOOD PRESSURE: 89 MMHG | OXYGEN SATURATION: 100 % | TEMPERATURE: 98.4 F | SYSTOLIC BLOOD PRESSURE: 145 MMHG

## 2018-02-13 DIAGNOSIS — J40 BRONCHITIS: ICD-10-CM

## 2018-02-13 DIAGNOSIS — J06.9 ACUTE URI: ICD-10-CM

## 2018-02-13 DIAGNOSIS — R11.0 NAUSEA: Primary | ICD-10-CM

## 2018-02-13 PROCEDURE — 99203 OFFICE O/P NEW LOW 30 MIN: CPT | Performed by: NURSE PRACTITIONER

## 2018-02-13 RX ORDER — FLUTICASONE PROPIONATE 50 MCG
1 SPRAY, SUSPENSION (ML) NASAL DAILY
Qty: 1 BOTTLE | Refills: 0 | Status: SHIPPED | OUTPATIENT
Start: 2018-02-13 | End: 2018-03-15

## 2018-02-13 RX ORDER — ONDANSETRON 8 MG/1
8 TABLET, ORALLY DISINTEGRATING ORAL EVERY 8 HOURS PRN
Qty: 12 TABLET | Refills: 0 | Status: SHIPPED | OUTPATIENT
Start: 2018-02-13 | End: 2018-05-04

## 2018-02-13 RX ORDER — CEFUROXIME AXETIL 250 MG/1
250 TABLET ORAL 2 TIMES DAILY
Qty: 20 TABLET | Refills: 0 | Status: SHIPPED | OUTPATIENT
Start: 2018-02-13 | End: 2018-03-17

## 2018-02-13 RX ORDER — METHYLPREDNISOLONE 4 MG/1
TABLET ORAL
Qty: 1 EACH | Refills: 0 | Status: SHIPPED | OUTPATIENT
Start: 2018-02-13 | End: 2018-03-17

## 2018-02-13 NOTE — PROGRESS NOTES
Subjective   Etta Ibanez is a 42 y.o. female here for cough, nausea, H/A.     Nausea   This is a new problem. The current episode started in the past 7 days (3-4 days ago). The problem occurs constantly. The problem has been unchanged. Associated symptoms include anorexia, congestion, coughing (non-productive, no hemoptysis), fatigue, headaches (across forehead, like sinus H/A), nausea and a sore throat (slight). Pertinent negatives include no abdominal pain, arthralgias, change in bowel habit, chest pain, chills, diaphoresis, fever, joint swelling, myalgias, neck pain, numbness, rash, swollen glands, urinary symptoms, vomiting or weakness. Exacerbated by: wheezes with lying down. She has tried nothing for the symptoms.   Cough   Associated symptoms include headaches (across forehead, like sinus H/A), postnasal drip, rhinorrhea and a sore throat (slight). Pertinent negatives include no chest pain, chills, eye redness, fever, myalgias, rash or wheezing. There is no history of environmental allergies.       The following portions of the patient's history were reviewed and updated as appropriate: allergies, current medications, past family history, past medical history, past social history, past surgical history and problem list.    Review of Systems   Constitutional: Positive for fatigue. Negative for activity change, appetite change, chills, diaphoresis and fever.   HENT: Positive for congestion, postnasal drip, rhinorrhea, sinus pressure and sore throat (slight). Negative for sinus pain, trouble swallowing and voice change.    Eyes: Negative for discharge and redness.   Respiratory: Positive for cough (non-productive, no hemoptysis). Negative for choking, chest tightness and wheezing.    Cardiovascular: Negative for chest pain.   Gastrointestinal: Positive for anorexia and nausea. Negative for abdominal distention, abdominal pain, change in bowel habit, constipation, diarrhea and vomiting.   Genitourinary: Negative  for difficulty urinating and dysuria.   Musculoskeletal: Positive for back pain (chronic). Negative for arthralgias, joint swelling, myalgias and neck pain.   Skin: Negative for rash.   Allergic/Immunologic: Negative for environmental allergies, food allergies and immunocompromised state.   Neurological: Positive for headaches (across forehead, like sinus H/A). Negative for seizures, weakness and numbness.   Hematological: Negative for adenopathy.       Objective   Physical Exam   Constitutional: She is oriented to person, place, and time. She appears well-developed and well-nourished. No distress.   HENT:   Head: Normocephalic and atraumatic.   Right Ear: External ear normal.   Left Ear: External ear normal.   Nose: Nose normal.   Mouth/Throat: No oropharyngeal exudate (post-nasal drainage and erythema).   Eyes: Conjunctivae and EOM are normal. Pupils are equal, round, and reactive to light. Right eye exhibits no discharge. Left eye exhibits no discharge. No scleral icterus.   Neck: Normal range of motion. Neck supple. No JVD present. No tracheal deviation present. No thyromegaly present.   Cardiovascular: Normal rate and regular rhythm.    Pulmonary/Chest: Effort normal. No stridor. No respiratory distress. She has wheezes (bilateral inspiratory wheezes that clear with deep breath). She has no rales.   Abdominal: Soft. She exhibits no distension. There is no tenderness.   Musculoskeletal: Normal range of motion. She exhibits no edema, tenderness or deformity.   Lymphadenopathy:     She has no cervical adenopathy.   Neurological: She is alert and oriented to person, place, and time. She exhibits normal muscle tone. Coordination normal.   Skin: Skin is warm and dry. No rash noted. She is not diaphoretic. No erythema. No pallor.   Psychiatric: She has a normal mood and affect. Her behavior is normal. Judgment and thought content normal.   Nursing note and vitals reviewed.      Assessment/Plan   Etta was seen today  for nausea, nasal congestion and cough.    Diagnoses and all orders for this visit:    Nausea    Acute URI    Bronchitis    Other orders  -     cefuroxime (CEFTIN) 250 MG tablet; Take 1 tablet by mouth 2 (Two) Times a Day.  -     MethylPREDNISolone (MEDROL, FUENTES,) 4 MG tablet; Take as directed on package instructions.  -     ondansetron ODT (ZOFRAN ODT) 8 MG disintegrating tablet; Take 1 tablet by mouth Every 8 (Eight) Hours As Needed for Nausea or Vomiting.  -     fluticasone (FLONASE) 50 MCG/ACT nasal spray; 1 spray into each nostril Daily for 30 days.              BP check here in 1-2 weeks for F/U.

## 2018-03-17 ENCOUNTER — HOSPITAL ENCOUNTER (EMERGENCY)
Facility: HOSPITAL | Age: 43
Discharge: HOME | End: 2018-03-17
Admitting: EMERGENCY MEDICINE

## 2018-03-17 VITALS
RESPIRATION RATE: 16 BRPM | HEIGHT: 69 IN | OXYGEN SATURATION: 99 % | DIASTOLIC BLOOD PRESSURE: 76 MMHG | TEMPERATURE: 98.3 F | SYSTOLIC BLOOD PRESSURE: 128 MMHG | WEIGHT: 184 LBS | BODY MASS INDEX: 27.25 KG/M2 | HEART RATE: 85 BPM

## 2018-03-17 DIAGNOSIS — N39.0 URINARY TRACT INFECTION WITHOUT HEMATURIA, SITE UNSPECIFIED: Primary | ICD-10-CM

## 2018-03-17 LAB
ALBUMIN SERPL-MCNC: 4.1 G/DL (ref 3.5–5)
ALBUMIN/GLOB SERPL: 1.2 G/DL (ref 1.1–2.5)
ALP SERPL-CCNC: 68 U/L (ref 24–120)
ALT SERPL W P-5'-P-CCNC: 22 U/L (ref 0–54)
ANION GAP SERPL CALCULATED.3IONS-SCNC: 14 MMOL/L (ref 4–13)
AST SERPL-CCNC: 32 U/L (ref 7–45)
B-HCG UR QL: NEGATIVE
BACTERIA UR QL AUTO: ABNORMAL /HPF
BASOPHILS # BLD AUTO: 0.04 10*3/MM3 (ref 0–0.2)
BASOPHILS NFR BLD AUTO: 0.6 % (ref 0–2)
BILIRUB SERPL-MCNC: 0.2 MG/DL (ref 0.1–1)
BILIRUB UR QL STRIP: NEGATIVE
BUN BLD-MCNC: 7 MG/DL (ref 5–21)
BUN/CREAT SERPL: 8.9 (ref 7–25)
CALCIUM SPEC-SCNC: 8.9 MG/DL (ref 8.4–10.4)
CHLORIDE SERPL-SCNC: 103 MMOL/L (ref 98–110)
CLARITY UR: CLEAR
CLUE CELLS SPEC QL WET PREP: ABNORMAL
CO2 SERPL-SCNC: 26 MMOL/L (ref 24–31)
COLOR UR: YELLOW
CREAT BLD-MCNC: 0.79 MG/DL (ref 0.5–1.4)
D-LACTATE SERPL-SCNC: 2 MMOL/L (ref 0.5–2)
DEPRECATED RDW RBC AUTO: 43.9 FL (ref 40–54)
EOSINOPHIL # BLD AUTO: 0.13 10*3/MM3 (ref 0–0.7)
EOSINOPHIL NFR BLD AUTO: 2 % (ref 0–4)
ERYTHROCYTE [DISTWIDTH] IN BLOOD BY AUTOMATED COUNT: 15.7 % (ref 12–15)
FLUAV AG NPH QL: NEGATIVE
FLUBV AG NPH QL IA: NEGATIVE
GFR SERPL CREATININE-BSD FRML MDRD: 80 ML/MIN/1.73
GLOBULIN UR ELPH-MCNC: 3.5 GM/DL
GLUCOSE BLD-MCNC: 104 MG/DL (ref 70–100)
GLUCOSE UR STRIP-MCNC: NEGATIVE MG/DL
HCG SERPL QL: NEGATIVE
HCT VFR BLD AUTO: 41.8 % (ref 37–47)
HGB BLD-MCNC: 13.2 G/DL (ref 12–16)
HGB UR QL STRIP.AUTO: NEGATIVE
HYALINE CASTS UR QL AUTO: ABNORMAL /LPF
HYDATID CYST SPEC WET PREP: ABNORMAL
IMM GRANULOCYTES # BLD: 0.02 10*3/MM3 (ref 0–0.03)
IMM GRANULOCYTES NFR BLD: 0.3 % (ref 0–5)
INTERNAL NEGATIVE CONTROL: NEGATIVE
INTERNAL POSITIVE CONTROL: POSITIVE
KETONES UR QL STRIP: NEGATIVE
LEUKOCYTE ESTERASE UR QL STRIP.AUTO: ABNORMAL
LYMPHOCYTES # BLD AUTO: 2.2 10*3/MM3 (ref 0.72–4.86)
LYMPHOCYTES NFR BLD AUTO: 33.6 % (ref 15–45)
Lab: NORMAL
MCH RBC QN AUTO: 24.8 PG (ref 28–32)
MCHC RBC AUTO-ENTMCNC: 31.6 G/DL (ref 33–36)
MCV RBC AUTO: 78.6 FL (ref 82–98)
MONOCYTES # BLD AUTO: 0.44 10*3/MM3 (ref 0.19–1.3)
MONOCYTES NFR BLD AUTO: 6.7 % (ref 4–12)
NEUTROPHILS # BLD AUTO: 3.72 10*3/MM3 (ref 1.87–8.4)
NEUTROPHILS NFR BLD AUTO: 56.8 % (ref 39–78)
NITRITE UR QL STRIP: NEGATIVE
NRBC BLD MANUAL-RTO: 0 /100 WBC (ref 0–0)
PH UR STRIP.AUTO: 6 [PH] (ref 5–8)
PLATELET # BLD AUTO: 362 10*3/MM3 (ref 130–400)
PMV BLD AUTO: 10.8 FL (ref 6–12)
POTASSIUM BLD-SCNC: 3.7 MMOL/L (ref 3.5–5.3)
PROT SERPL-MCNC: 7.6 G/DL (ref 6.3–8.7)
PROT UR QL STRIP: NEGATIVE
RBC # BLD AUTO: 5.32 10*6/MM3 (ref 4.2–5.4)
RBC # UR: ABNORMAL /HPF
REF LAB TEST METHOD: ABNORMAL
SODIUM BLD-SCNC: 143 MMOL/L (ref 135–145)
SP GR UR STRIP: 1.01 (ref 1–1.03)
SQUAMOUS #/AREA URNS HPF: ABNORMAL /HPF
T VAGINALIS SPEC QL WET PREP: ABNORMAL
UROBILINOGEN UR QL STRIP: ABNORMAL
WBC NRBC COR # BLD: 6.55 10*3/MM3 (ref 4.8–10.8)
WBC SPEC QL WET PREP: ABNORMAL
WBC UR QL AUTO: ABNORMAL /HPF
YEAST GENITAL QL WET PREP: ABNORMAL

## 2018-03-17 PROCEDURE — 87804 INFLUENZA ASSAY W/OPTIC: CPT | Performed by: NURSE PRACTITIONER

## 2018-03-17 PROCEDURE — 83605 ASSAY OF LACTIC ACID: CPT | Performed by: NURSE PRACTITIONER

## 2018-03-17 PROCEDURE — 84703 CHORIONIC GONADOTROPIN ASSAY: CPT | Performed by: NURSE PRACTITIONER

## 2018-03-17 PROCEDURE — 80053 COMPREHEN METABOLIC PANEL: CPT | Performed by: NURSE PRACTITIONER

## 2018-03-17 PROCEDURE — 87591 N.GONORRHOEAE DNA AMP PROB: CPT | Performed by: NURSE PRACTITIONER

## 2018-03-17 PROCEDURE — 99283 EMERGENCY DEPT VISIT LOW MDM: CPT

## 2018-03-17 PROCEDURE — 87210 SMEAR WET MOUNT SALINE/INK: CPT | Performed by: NURSE PRACTITIONER

## 2018-03-17 PROCEDURE — 81001 URINALYSIS AUTO W/SCOPE: CPT | Performed by: NURSE PRACTITIONER

## 2018-03-17 PROCEDURE — 85025 COMPLETE CBC W/AUTO DIFF WBC: CPT | Performed by: NURSE PRACTITIONER

## 2018-03-17 PROCEDURE — 87491 CHLMYD TRACH DNA AMP PROBE: CPT | Performed by: NURSE PRACTITIONER

## 2018-03-17 RX ORDER — VARENICLINE TARTRATE 0.5 MG/1
0.5 TABLET, FILM COATED ORAL 2 TIMES DAILY
COMMUNITY
End: 2018-05-04

## 2018-03-17 RX ORDER — NITROFURANTOIN 25; 75 MG/1; MG/1
100 CAPSULE ORAL 2 TIMES DAILY
Qty: 14 CAPSULE | Refills: 0 | Status: SHIPPED | OUTPATIENT
Start: 2018-03-17 | End: 2018-03-24

## 2018-03-17 NOTE — ED PROVIDER NOTES
Subjective   Patient is a 42-year-old  female presents ER today with complaint of pelvic pain, urinary frequency, fatigue, and low back pain.  Patient reports that the symptoms of been occurring for 1 week.  Patient states that she is concerned that she could be pregnant.  The patient states she had a tubal ligation many years ago.  She states that her last LMP was 5 weeks ago.  The patient states that the symptoms that she is having now are the same as when she was pregnant before.  The patient states that she read on the Internet that she could possibly have a tubal pregnancy and that she will not had a positive hCG test with this.  The patient states that she did take a pregnancy test at home and a half ago and this was negative.  Patient states she is concerned that she could be pregnant and would like to have her pregnancy test performed.  Patient reports nausea she denies vomiting.  She denies any fever cough congestion or abdominal pain.  She presents with her spouse today for further evaluation.        History provided by:  Patient   used: No    Female  Problem   Primary symptoms include pelvic pain, dysuria.  Primary symptoms include no discharge, no dyspareunia, no genital lesions, no genital pain, no genital itching, no genital odor, no vaginal bleeding. There has been no fever. This is a new problem. The current episode started more than 1 week ago. The problem occurs daily. The problem has not changed since onset.She is not pregnant. She has missed her period. The patient's menstrual history has been regular. Pertinent negatives include no anorexia, no diaphoresis, no abdominal swelling, no abdominal pain, no constipation, no diarrhea, no nausea, no vomiting, no frequency and no light-headedness. She has tried nothing for the symptoms. The treatment provided no relief. Sexual activity: sexually active. There is no concern regarding sexually transmitted diseases. She uses  nothing for contraception. Associated medical issues do not include STD, PID, herpes simplex, perineal abscess, vaginosis, hypermenorrhea, ovarian cysts, endometriosis, gynecological surgery, ectopic pregnancy,  section, miscarriage or terminated pregnancy.       Review of Systems   Constitutional: Negative for diaphoresis.   Gastrointestinal: Negative for abdominal pain, anorexia, constipation, diarrhea, nausea and vomiting.   Genitourinary: Positive for dysuria and pelvic pain. Negative for dyspareunia, frequency and vaginal bleeding.   Neurological: Negative for light-headedness.   All other systems reviewed and are negative.      Past Medical History:   Diagnosis Date   • ADHD (attention deficit hyperactivity disorder)    • Back pain    • Neck pain        No Known Allergies    Past Surgical History:   Procedure Laterality Date   •  SECTION     • DENTAL PROCEDURE     • HAND SURGERY     • TUBAL ABDOMINAL LIGATION         History reviewed. No pertinent family history.    Social History     Social History   • Marital status:      Social History Main Topics   • Smoking status: Current Every Day Smoker     Packs/day: 1.00     Types: Cigarettes   • Smokeless tobacco: Never Used   • Alcohol use No   • Drug use: No   • Sexual activity: Defer     Other Topics Concern   • Not on file           Objective   Physical Exam   Constitutional: She appears well-developed and well-nourished.   HENT:   Head: Normocephalic and atraumatic.   Cardiovascular: Normal rate, regular rhythm and normal heart sounds.    Pulmonary/Chest: Effort normal and breath sounds normal.   Abdominal: Soft. Bowel sounds are normal.   Genitourinary: Pelvic exam was performed with patient prone. No erythema, tenderness or bleeding in the vagina. No foreign body in the vagina. No signs of injury around the vagina. No vaginal discharge found.   Genitourinary Comments: No CMT, no discharge noted on exam  Exam chaperoned per Evelin Kiran,  RN   Skin: Skin is warm and dry. Capillary refill takes less than 2 seconds.   Psychiatric: She has a normal mood and affect.   Nursing note and vitals reviewed.      Procedures         ED Course  ED Course   Comment By Time   The patient's labs were reviewed.  Patient had a negative urine pregnancy test.  She did request to have a blood test performed.  This was negative as well.  Please swab negative.  Her white blood cell count was normal.  Urinalysis did show trace amount of esterase, 0-2 RBCs, 3-5 WBCs, no bacteria.  Patient does have symptoms consistent with urinary tract infection and a trigger for this.  Patient is advised to follow her OB/GYN on Monday.  I did offer the patient a CT scan or complaint of lower pelvic pain however she has declined.  She states that she prefers to follow-up with her OB/GYN on Monday.  At this time she will be discharged home in stable condition.  We will give her prescription for antibiotic to treat the urinary tract infection.  She is asked return to the ER if any new or worsening symptoms.  She will be discharged home at this time in stable condition. Nichole Mark, APRN 03/17 1635          Labs Reviewed   WET PREP, GENITAL - Abnormal; Notable for the following:        Result Value    WBC'S 1+ WBC's seen (*)     All other components within normal limits   URINALYSIS W/ CULTURE IF INDICATED - Abnormal; Notable for the following:     Leuk Esterase, UA Trace (*)     All other components within normal limits   COMPREHENSIVE METABOLIC PANEL - Abnormal; Notable for the following:     Glucose 104 (*)     Anion Gap 14.0 (*)     All other components within normal limits   CBC WITH AUTO DIFFERENTIAL - Abnormal; Notable for the following:     MCV 78.6 (*)     MCH 24.8 (*)     MCHC 31.6 (*)     RDW 15.7 (*)     All other components within normal limits   URINALYSIS, MICROSCOPIC ONLY - Abnormal; Notable for the following:     RBC, UA 0-2 (*)     WBC, UA 3-5 (*)     All other  components within normal limits   INFLUENZA ANTIGEN, RAPID - Normal    Narrative:     Recommend confirmation of negative results by viral culture or molecular assay.   HCG, SERUM, QUALITATIVE - Normal   LACTIC ACID, PLASMA - Normal   POCT PEFORM URINE PREGNANCY - Normal   CHLAMYDIA TRACHOMATIS, NEISSERIA GONORRHOEAE, PCR   CBC AND DIFFERENTIAL    Narrative:     The following orders were created for panel order CBC & Differential.  Procedure                               Abnormality         Status                     ---------                               -----------         ------                     CBC Auto Differential[[id number]]        Abnormal            Final result                 Please view results for these tests on the individual orders.             MDM  Number of Diagnoses or Management Options  Urinary tract infection without hematuria, site unspecified: new and requires workup     Amount and/or Complexity of Data Reviewed  Clinical lab tests: ordered and reviewed    Patient Progress  Patient progress: stable      Final diagnoses:   Urinary tract infection without hematuria, site unspecified            Nichole Mark, APRN  03/17/18 1479

## 2018-03-17 NOTE — ED NOTES
C/o's 'I'm having abdominal, headache, back pain, pee'ing a lot, no period in 5 weeks, fatigue, insomnia.' Denies dysuria, denies constipation, reports 'eating & drinking okay.' 'Abdominal pain & back pain for couple weeks.' 'fatigue started couple days ago.'     Evelin Kiran, FERDA  03/17/18 1547

## 2018-03-20 LAB
C TRACH RRNA SPEC DONR QL NAA+PROBE: NEGATIVE
N GONORRHOEA DNA SPEC QL NAA+PROBE: NEGATIVE

## 2018-05-04 ENCOUNTER — OFFICE VISIT (OUTPATIENT)
Dept: RETAIL CLINIC | Facility: CLINIC | Age: 43
End: 2018-05-04

## 2018-05-04 VITALS
HEART RATE: 98 BPM | SYSTOLIC BLOOD PRESSURE: 136 MMHG | RESPIRATION RATE: 20 BRPM | TEMPERATURE: 98.1 F | OXYGEN SATURATION: 98 % | DIASTOLIC BLOOD PRESSURE: 89 MMHG

## 2018-05-04 DIAGNOSIS — J40 BRONCHITIS: ICD-10-CM

## 2018-05-04 DIAGNOSIS — J06.9 ACUTE URI: Primary | ICD-10-CM

## 2018-05-04 PROCEDURE — 99213 OFFICE O/P EST LOW 20 MIN: CPT | Performed by: NURSE PRACTITIONER

## 2018-05-04 PROCEDURE — 96372 THER/PROPH/DIAG INJ SC/IM: CPT | Performed by: NURSE PRACTITIONER

## 2018-05-04 RX ORDER — PROMETHAZINE HYDROCHLORIDE AND PHENYLEPHRINE HYDROCHLORIDE 6.25; 5 MG/5ML; MG/5ML
5 SYRUP ORAL EVERY 6 HOURS PRN
Qty: 240 ML | Refills: 0 | Status: SHIPPED | OUTPATIENT
Start: 2018-05-04

## 2018-05-04 RX ORDER — ALBUTEROL SULFATE 90 UG/1
2 AEROSOL, METERED RESPIRATORY (INHALATION) EVERY 6 HOURS PRN
Qty: 1 INHALER | Refills: 0 | Status: SHIPPED | OUTPATIENT
Start: 2018-05-04

## 2018-05-04 RX ORDER — TRIAMCINOLONE ACETONIDE 40 MG/ML
60 INJECTION, SUSPENSION INTRA-ARTICULAR; INTRAMUSCULAR ONCE
Status: COMPLETED | OUTPATIENT
Start: 2018-05-04 | End: 2018-05-04

## 2018-05-04 RX ORDER — TRAZODONE HYDROCHLORIDE 100 MG/1
TABLET ORAL
COMMUNITY

## 2018-05-04 RX ORDER — FLUTICASONE PROPIONATE 50 MCG
1 SPRAY, SUSPENSION (ML) NASAL DAILY
Qty: 1 BOTTLE | Refills: 0 | Status: SHIPPED | OUTPATIENT
Start: 2018-05-04 | End: 2018-06-03

## 2018-05-04 RX ORDER — CEFUROXIME AXETIL 250 MG/1
250 TABLET ORAL 2 TIMES DAILY
Qty: 20 TABLET | Refills: 0 | Status: SHIPPED | OUTPATIENT
Start: 2018-05-04

## 2018-05-04 RX ADMIN — TRIAMCINOLONE ACETONIDE 60 MG: 40 INJECTION, SUSPENSION INTRA-ARTICULAR; INTRAMUSCULAR at 08:54

## 2018-05-04 NOTE — PROGRESS NOTES
Subjective   Etta Ibanez is a 42 y.o. female here for cough and congestion.     Cough   This is a new problem. The current episode started yesterday. The problem has been rapidly worsening. The problem occurs every few minutes. The cough is productive of sputum (clear). Associated symptoms include ear congestion, ear pain, nasal congestion, postnasal drip, rhinorrhea (clear), a sore throat and wheezing. Pertinent negatives include no chest pain, chills, eye redness, fever, hemoptysis, myalgias, rash or shortness of breath. Nothing aggravates the symptoms. Treatments tried: allergy pill yesterday. The treatment provided mild relief. Her past medical history is significant for bronchitis and environmental allergies. There is no history of asthma, COPD, emphysema or pneumonia.       The following portions of the patient's history were reviewed and updated as appropriate: allergies, current medications, past family history, past medical history, past social history, past surgical history and problem list.    Review of Systems   Constitutional: Negative for activity change, appetite change, chills and fever.   HENT: Positive for congestion, ear pain, postnasal drip, rhinorrhea (clear) and sore throat. Negative for trouble swallowing.    Eyes: Negative for discharge, redness and itching.   Respiratory: Positive for cough, chest tightness and wheezing. Negative for hemoptysis and shortness of breath.    Cardiovascular: Negative for chest pain and leg swelling.   Gastrointestinal: Positive for nausea. Negative for abdominal pain, diarrhea and vomiting.   Genitourinary: Negative for dysuria.   Musculoskeletal: Negative for arthralgias, back pain, gait problem, joint swelling, myalgias, neck pain and neck stiffness.   Skin: Negative for rash.   Allergic/Immunologic: Positive for environmental allergies. Negative for food allergies and immunocompromised state.   Neurological: Negative for seizures.   Hematological: Negative  for adenopathy.       Objective   Physical Exam   Constitutional: She is oriented to person, place, and time. She appears well-nourished. No distress.   HENT:   Head: Normocephalic and atraumatic.   Right Ear: External ear normal.   Left Ear: External ear normal.   Nose: Nose normal.   Mouth/Throat: No oropharyngeal exudate.   TM's dull without erythema   Eyes: Conjunctivae and EOM are normal. Pupils are equal, round, and reactive to light. Right eye exhibits no discharge. Left eye exhibits no discharge. No scleral icterus.   Neck: Normal range of motion. Neck supple. No JVD present. No tracheal deviation present. No thyromegaly present.   Cardiovascular: Normal rate, regular rhythm and normal heart sounds.    Pulmonary/Chest: Effort normal. No stridor. No respiratory distress. She has wheezes (faint bilateral inspiratory wheezes). She has no rales. She exhibits no tenderness.   Abdominal: Soft. She exhibits no distension. There is no tenderness.   Musculoskeletal: Normal range of motion. She exhibits no edema, tenderness or deformity.   Lymphadenopathy:     She has no cervical adenopathy.   Neurological: She is alert and oriented to person, place, and time. She exhibits normal muscle tone. Coordination normal.   Skin: Skin is warm and dry. No rash noted. She is not diaphoretic. No erythema. No pallor.   Psychiatric: She has a normal mood and affect. Her behavior is normal. Judgment and thought content normal.   Nursing note and vitals reviewed.      Assessment/Plan   Etta was seen today for cough and nasal congestion.    Diagnoses and all orders for this visit:    Acute URI  -     triamcinolone acetonide (KENALOG-40) injection 60 mg; Inject 1.5 mL into the shoulder, thigh, or buttocks 1 (One) Time.  -     fluticasone (FLONASE) 50 MCG/ACT nasal spray; 1 spray into each nostril Daily for 30 days.    Bronchitis  -     triamcinolone acetonide (KENALOG-40) injection 60 mg; Inject 1.5 mL into the shoulder, thigh, or  buttocks 1 (One) Time.  -     cefuroxime (CEFTIN) 250 MG tablet; Take 1 tablet by mouth 2 (Two) Times a Day.  -     albuterol (PROVENTIL HFA;VENTOLIN HFA) 108 (90 Base) MCG/ACT inhaler; Inhale 2 puffs Every 6 (Six) Hours As Needed for Wheezing.  -     promethazine-phenylephrine 6.25-5 MG/5ML syrup syrup; Take 5 mL by mouth Every 6 (Six) Hours As Needed (cough).

## 2018-05-28 ENCOUNTER — HOSPITAL ENCOUNTER (EMERGENCY)
Facility: HOSPITAL | Age: 43
Discharge: HOME | End: 2018-05-28
Admitting: FAMILY MEDICINE

## 2018-05-28 ENCOUNTER — APPOINTMENT (OUTPATIENT)
Dept: GENERAL RADIOLOGY | Facility: HOSPITAL | Age: 43
End: 2018-05-28

## 2018-05-28 VITALS
HEART RATE: 85 BPM | HEIGHT: 69 IN | TEMPERATURE: 98 F | BODY MASS INDEX: 25.77 KG/M2 | DIASTOLIC BLOOD PRESSURE: 81 MMHG | SYSTOLIC BLOOD PRESSURE: 150 MMHG | OXYGEN SATURATION: 97 % | RESPIRATION RATE: 15 BRPM | WEIGHT: 174 LBS

## 2018-05-28 DIAGNOSIS — Z72.0 TOBACCO ABUSE: ICD-10-CM

## 2018-05-28 DIAGNOSIS — J40 BRONCHITIS: Primary | ICD-10-CM

## 2018-05-28 LAB
ANION GAP SERPL CALCULATED.3IONS-SCNC: 12 MMOL/L (ref 4–13)
BASOPHILS # BLD AUTO: 0.04 10*3/MM3 (ref 0–0.2)
BASOPHILS NFR BLD AUTO: 0.4 % (ref 0–2)
BUN BLD-MCNC: 7 MG/DL (ref 5–21)
BUN/CREAT SERPL: 10.8 (ref 7–25)
CALCIUM SPEC-SCNC: 8.8 MG/DL (ref 8.4–10.4)
CHLORIDE SERPL-SCNC: 105 MMOL/L (ref 98–110)
CO2 SERPL-SCNC: 24 MMOL/L (ref 24–31)
CREAT BLD-MCNC: 0.65 MG/DL (ref 0.5–1.4)
DEPRECATED RDW RBC AUTO: 40.4 FL (ref 40–54)
EOSINOPHIL # BLD AUTO: 0.28 10*3/MM3 (ref 0–0.7)
EOSINOPHIL NFR BLD AUTO: 2.8 % (ref 0–4)
ERYTHROCYTE [DISTWIDTH] IN BLOOD BY AUTOMATED COUNT: 14.8 % (ref 12–15)
GFR SERPL CREATININE-BSD FRML MDRD: 100 ML/MIN/1.73
GLUCOSE BLD-MCNC: 132 MG/DL (ref 70–100)
HCT VFR BLD AUTO: 36.8 % (ref 37–47)
HGB BLD-MCNC: 11.8 G/DL (ref 12–16)
IMM GRANULOCYTES # BLD: 0.05 10*3/MM3 (ref 0–0.03)
IMM GRANULOCYTES NFR BLD: 0.5 % (ref 0–5)
LYMPHOCYTES # BLD AUTO: 2.34 10*3/MM3 (ref 0.72–4.86)
LYMPHOCYTES NFR BLD AUTO: 23.2 % (ref 15–45)
MCH RBC QN AUTO: 24.6 PG (ref 28–32)
MCHC RBC AUTO-ENTMCNC: 32.1 G/DL (ref 33–36)
MCV RBC AUTO: 76.8 FL (ref 82–98)
MONOCYTES # BLD AUTO: 0.55 10*3/MM3 (ref 0.19–1.3)
MONOCYTES NFR BLD AUTO: 5.4 % (ref 4–12)
NEUTROPHILS # BLD AUTO: 6.84 10*3/MM3 (ref 1.87–8.4)
NEUTROPHILS NFR BLD AUTO: 67.7 % (ref 39–78)
NRBC BLD MANUAL-RTO: 0 /100 WBC (ref 0–0)
PLATELET # BLD AUTO: 407 10*3/MM3 (ref 130–400)
PMV BLD AUTO: 10 FL (ref 6–12)
POTASSIUM BLD-SCNC: 3.3 MMOL/L (ref 3.5–5.3)
RBC # BLD AUTO: 4.79 10*6/MM3 (ref 4.2–5.4)
SODIUM BLD-SCNC: 141 MMOL/L (ref 135–145)
WBC NRBC COR # BLD: 10.1 10*3/MM3 (ref 4.8–10.8)

## 2018-05-28 PROCEDURE — 99283 EMERGENCY DEPT VISIT LOW MDM: CPT

## 2018-05-28 PROCEDURE — 85025 COMPLETE CBC W/AUTO DIFF WBC: CPT | Performed by: PHYSICIAN ASSISTANT

## 2018-05-28 PROCEDURE — 96372 THER/PROPH/DIAG INJ SC/IM: CPT

## 2018-05-28 PROCEDURE — 94799 UNLISTED PULMONARY SVC/PX: CPT

## 2018-05-28 PROCEDURE — 25010000002 METHYLPREDNISOLONE PER 125 MG: Performed by: PHYSICIAN ASSISTANT

## 2018-05-28 PROCEDURE — 94760 N-INVAS EAR/PLS OXIMETRY 1: CPT

## 2018-05-28 PROCEDURE — 71046 X-RAY EXAM CHEST 2 VIEWS: CPT

## 2018-05-28 PROCEDURE — 94640 AIRWAY INHALATION TREATMENT: CPT

## 2018-05-28 PROCEDURE — 80048 BASIC METABOLIC PNL TOTAL CA: CPT | Performed by: PHYSICIAN ASSISTANT

## 2018-05-28 RX ORDER — METHYLPREDNISOLONE SODIUM SUCCINATE 125 MG/2ML
125 INJECTION, POWDER, LYOPHILIZED, FOR SOLUTION INTRAMUSCULAR; INTRAVENOUS ONCE
Status: COMPLETED | OUTPATIENT
Start: 2018-05-28 | End: 2018-05-28

## 2018-05-28 RX ORDER — AZITHROMYCIN 250 MG/1
250 TABLET, FILM COATED ORAL DAILY
Qty: 6 TABLET | Refills: 0 | Status: SHIPPED | OUTPATIENT
Start: 2018-05-28

## 2018-05-28 RX ORDER — METHYLPREDNISOLONE SODIUM SUCCINATE 125 MG/2ML
125 INJECTION, POWDER, LYOPHILIZED, FOR SOLUTION INTRAMUSCULAR; INTRAVENOUS ONCE
Status: DISCONTINUED | OUTPATIENT
Start: 2018-05-28 | End: 2018-05-28

## 2018-05-28 RX ORDER — ALBUTEROL SULFATE 2.5 MG/3ML
2.5 SOLUTION RESPIRATORY (INHALATION) ONCE
Status: COMPLETED | OUTPATIENT
Start: 2018-05-28 | End: 2018-05-28

## 2018-05-28 RX ORDER — IPRATROPIUM BROMIDE AND ALBUTEROL SULFATE 2.5; .5 MG/3ML; MG/3ML
3 SOLUTION RESPIRATORY (INHALATION) ONCE
Status: COMPLETED | OUTPATIENT
Start: 2018-05-28 | End: 2018-05-28

## 2018-05-28 RX ADMIN — IPRATROPIUM BROMIDE AND ALBUTEROL SULFATE 3 ML: 2.5; .5 SOLUTION RESPIRATORY (INHALATION) at 09:44

## 2018-05-28 RX ADMIN — METHYLPREDNISOLONE SODIUM SUCCINATE 125 MG: 125 INJECTION, POWDER, FOR SOLUTION INTRAMUSCULAR; INTRAVENOUS at 09:31

## 2018-05-28 RX ADMIN — ALBUTEROL SULFATE 2.5 MG: 2.5 SOLUTION RESPIRATORY (INHALATION) at 08:55

## 2018-12-13 ENCOUNTER — OFFICE VISIT CONVERTED (OUTPATIENT)
Dept: ORTHOPEDIC SURGERY | Facility: CLINIC | Age: 43
End: 2018-12-13
Attending: ORTHOPAEDIC SURGERY

## 2019-03-08 ENCOUNTER — HOSPITAL ENCOUNTER (OUTPATIENT)
Dept: URGENT CARE | Facility: CLINIC | Age: 44
Discharge: HOME | End: 2019-03-08
Attending: FAMILY MEDICINE

## 2021-05-15 VITALS — WEIGHT: 177.25 LBS | HEART RATE: 83 BPM | OXYGEN SATURATION: 96 % | BODY MASS INDEX: 26.25 KG/M2 | HEIGHT: 69 IN

## 2022-03-26 ENCOUNTER — HOSPITAL ENCOUNTER (EMERGENCY)
Facility: HOSPITAL | Age: 47
Discharge: HOME | End: 2022-03-26
Attending: EMERGENCY MEDICINE | Admitting: EMERGENCY MEDICINE

## 2022-03-26 VITALS
SYSTOLIC BLOOD PRESSURE: 139 MMHG | HEIGHT: 69 IN | DIASTOLIC BLOOD PRESSURE: 88 MMHG | HEART RATE: 93 BPM | WEIGHT: 158 LBS | BODY MASS INDEX: 23.4 KG/M2 | RESPIRATION RATE: 16 BRPM | OXYGEN SATURATION: 96 % | TEMPERATURE: 97.9 F

## 2022-03-26 DIAGNOSIS — J01.00 ACUTE NON-RECURRENT MAXILLARY SINUSITIS: Primary | ICD-10-CM

## 2022-03-26 PROCEDURE — 99282 EMERGENCY DEPT VISIT SF MDM: CPT

## 2022-03-26 RX ORDER — AMOXICILLIN AND CLAVULANATE POTASSIUM 875; 125 MG/1; MG/1
1 TABLET, FILM COATED ORAL 2 TIMES DAILY
Qty: 14 TABLET | Refills: 0 | Status: SHIPPED | OUTPATIENT
Start: 2022-03-26 | End: 2022-04-02

## 2022-03-26 RX ORDER — PREDNISONE 50 MG/1
50 TABLET ORAL DAILY
Qty: 5 TABLET | Refills: 0 | Status: SHIPPED | OUTPATIENT
Start: 2022-03-26 | End: 2022-03-31

## 2022-03-26 RX ORDER — GUAIFENESIN DEXTROMETHORPHAN HYDROBROMIDE ORAL SOLUTION 10; 100 MG/5ML; MG/5ML
5 SOLUTION ORAL EVERY 12 HOURS
Qty: 118 ML | Refills: 0 | Status: SHIPPED | OUTPATIENT
Start: 2022-03-26 | End: 2022-03-31